# Patient Record
Sex: MALE | URBAN - METROPOLITAN AREA
[De-identification: names, ages, dates, MRNs, and addresses within clinical notes are randomized per-mention and may not be internally consistent; named-entity substitution may affect disease eponyms.]

---

## 2018-06-18 ENCOUNTER — APPOINTMENT (RX ONLY)
Dept: URBAN - METROPOLITAN AREA CLINIC 95 | Facility: CLINIC | Age: 23
Setting detail: DERMATOLOGY
End: 2018-06-18

## 2018-06-18 DIAGNOSIS — L73.9 FOLLICULAR DISORDER, UNSPECIFIED: ICD-10-CM

## 2018-06-18 DIAGNOSIS — L738 OTHER SPECIFIED DISEASES OF HAIR AND HAIR FOLLICLES: ICD-10-CM

## 2018-06-18 DIAGNOSIS — L663 OTHER SPECIFIED DISEASES OF HAIR AND HAIR FOLLICLES: ICD-10-CM

## 2018-06-18 PROBLEM — L02.02 FURUNCLE OF FACE: Status: ACTIVE | Noted: 2018-06-18

## 2018-06-18 PROBLEM — L70.0 ACNE VULGARIS: Status: ACTIVE | Noted: 2018-06-18

## 2018-06-18 PROCEDURE — 99213 OFFICE O/P EST LOW 20 MIN: CPT

## 2018-06-18 PROCEDURE — ? PRESCRIPTION

## 2018-06-18 PROCEDURE — ? TREATMENT REGIMEN

## 2018-06-18 RX ORDER — CLINDAMYCIN PHOSPHATE 10 MG/ML
THIN LAYER SOLUTION TOPICAL BID
Qty: 1 | Refills: 3 | Status: ERX | COMMUNITY
Start: 2018-06-18

## 2018-06-18 RX ADMIN — CLINDAMYCIN PHOSPHATE THIN LAYER: 10 SOLUTION TOPICAL at 16:56

## 2018-06-18 ASSESSMENT — LOCATION DETAILED DESCRIPTION DERM: LOCATION DETAILED: RIGHT CHIN

## 2018-06-18 ASSESSMENT — LOCATION ZONE DERM: LOCATION ZONE: FACE

## 2018-06-18 ASSESSMENT — LOCATION SIMPLE DESCRIPTION DERM: LOCATION SIMPLE: CHIN

## 2018-06-18 NOTE — HPI: BUMPS
How Severe Are Your Bumps?: mild
Have Your Bumps Been Treated?: not been treated
Is This A New Presentation, Or A Follow-Up?: Bumps
Additional History: Started occurring after shaving.

## 2018-06-18 NOTE — PROCEDURE: TREATMENT REGIMEN
Plan: Reviewed cause and effect. Start Clindamyacin solution BID. Reviewed proper shaving technique. Discussed follow up if no improvement with topical medication.
Detail Level: Zone